# Patient Record
Sex: MALE | Race: WHITE | Employment: OTHER | URBAN - METROPOLITAN AREA
[De-identification: names, ages, dates, MRNs, and addresses within clinical notes are randomized per-mention and may not be internally consistent; named-entity substitution may affect disease eponyms.]

---

## 2017-12-19 PROBLEM — Z98.1 HISTORY OF FUSION OF CERVICAL SPINE: Status: ACTIVE | Noted: 2017-12-19

## 2018-01-25 PROBLEM — M50.30 DEGENERATIVE DISC DISEASE, CERVICAL: Status: ACTIVE | Noted: 2018-01-25

## 2021-12-25 ENCOUNTER — APPOINTMENT (OUTPATIENT)
Dept: CT IMAGING | Age: 61
End: 2021-12-25
Payer: COMMERCIAL

## 2021-12-25 ENCOUNTER — HOSPITAL ENCOUNTER (EMERGENCY)
Age: 61
Discharge: HOME | End: 2021-12-25
Payer: COMMERCIAL

## 2021-12-25 ENCOUNTER — APPOINTMENT (OUTPATIENT)
Dept: GENERAL RADIOLOGY | Age: 61
End: 2021-12-25
Payer: COMMERCIAL

## 2021-12-25 VITALS
HEART RATE: 74 BPM | OXYGEN SATURATION: 98 % | DIASTOLIC BLOOD PRESSURE: 82 MMHG | RESPIRATION RATE: 16 BRPM | TEMPERATURE: 98.7 F | BODY MASS INDEX: 26.61 KG/M2 | SYSTOLIC BLOOD PRESSURE: 135 MMHG | WEIGHT: 175 LBS

## 2021-12-25 DIAGNOSIS — G89.29 CHRONIC NECK PAIN: ICD-10-CM

## 2021-12-25 DIAGNOSIS — R52 GENERALIZED BODY ACHES: Primary | ICD-10-CM

## 2021-12-25 DIAGNOSIS — M50.30 DDD (DEGENERATIVE DISC DISEASE), CERVICAL: ICD-10-CM

## 2021-12-25 DIAGNOSIS — M50.30 BULGING OF CERVICAL INTERVERTEBRAL DISC: ICD-10-CM

## 2021-12-25 DIAGNOSIS — R51.9 NONINTRACTABLE HEADACHE, UNSPECIFIED CHRONICITY PATTERN, UNSPECIFIED HEADACHE TYPE: ICD-10-CM

## 2021-12-25 DIAGNOSIS — M54.2 CHRONIC NECK PAIN: ICD-10-CM

## 2021-12-25 LAB
ALBUMIN SERPL-MCNC: 4.4 G/DL (ref 3.5–5.2)
ALP BLD-CCNC: 85 U/L (ref 40–129)
ALT SERPL-CCNC: 27 U/L (ref 0–40)
ANION GAP SERPL CALCULATED.3IONS-SCNC: 13 MMOL/L (ref 7–16)
AST SERPL-CCNC: 30 U/L (ref 0–39)
BACTERIA: NORMAL /HPF
BASOPHILS ABSOLUTE: 0.04 E9/L (ref 0–0.2)
BASOPHILS RELATIVE PERCENT: 0.6 % (ref 0–2)
BILIRUB SERPL-MCNC: 0.5 MG/DL (ref 0–1.2)
BILIRUBIN URINE: NEGATIVE
BLOOD, URINE: NEGATIVE
BUN BLDV-MCNC: 15 MG/DL (ref 6–23)
CALCIUM SERPL-MCNC: 9.7 MG/DL (ref 8.6–10.2)
CHLORIDE BLD-SCNC: 103 MMOL/L (ref 98–107)
CLARITY: CLEAR
CO2: 23 MMOL/L (ref 22–29)
COLOR: YELLOW
CREAT SERPL-MCNC: 0.9 MG/DL (ref 0.7–1.2)
EOSINOPHILS ABSOLUTE: 0.06 E9/L (ref 0.05–0.5)
EOSINOPHILS RELATIVE PERCENT: 0.9 % (ref 0–6)
GFR AFRICAN AMERICAN: >60
GFR NON-AFRICAN AMERICAN: >60 ML/MIN/1.73
GLUCOSE BLD-MCNC: 100 MG/DL (ref 74–99)
GLUCOSE URINE: NEGATIVE MG/DL
HCT VFR BLD CALC: 44.7 % (ref 37–54)
HEMOGLOBIN: 15.4 G/DL (ref 12.5–16.5)
IMMATURE GRANULOCYTES #: 0.03 E9/L
IMMATURE GRANULOCYTES %: 0.5 % (ref 0–5)
KETONES, URINE: NEGATIVE MG/DL
LEUKOCYTE ESTERASE, URINE: NEGATIVE
LYMPHOCYTES ABSOLUTE: 0.9 E9/L (ref 1.5–4)
LYMPHOCYTES RELATIVE PERCENT: 13.8 % (ref 20–42)
MCH RBC QN AUTO: 32.8 PG (ref 26–35)
MCHC RBC AUTO-ENTMCNC: 34.5 % (ref 32–34.5)
MCV RBC AUTO: 95.1 FL (ref 80–99.9)
MONOCYTES ABSOLUTE: 0.43 E9/L (ref 0.1–0.95)
MONOCYTES RELATIVE PERCENT: 6.6 % (ref 2–12)
NEUTROPHILS ABSOLUTE: 5.05 E9/L (ref 1.8–7.3)
NEUTROPHILS RELATIVE PERCENT: 77.6 % (ref 43–80)
NITRITE, URINE: NEGATIVE
PDW BLD-RTO: 13.2 FL (ref 11.5–15)
PH UA: 5.5 (ref 5–9)
PLATELET # BLD: 257 E9/L (ref 130–450)
PMV BLD AUTO: 11 FL (ref 7–12)
POTASSIUM REFLEX MAGNESIUM: 4.8 MMOL/L (ref 3.5–5)
PROTEIN UA: NORMAL MG/DL
RBC # BLD: 4.7 E12/L (ref 3.8–5.8)
RBC UA: NORMAL /HPF (ref 0–2)
SODIUM BLD-SCNC: 139 MMOL/L (ref 132–146)
SPECIFIC GRAVITY UA: >=1.03 (ref 1–1.03)
TOTAL PROTEIN: 7.7 G/DL (ref 6.4–8.3)
TROPONIN, HIGH SENSITIVITY: <6 NG/L (ref 0–11)
UROBILINOGEN, URINE: 0.2 E.U./DL
WBC # BLD: 6.5 E9/L (ref 4.5–11.5)
WBC UA: NORMAL /HPF (ref 0–5)

## 2021-12-25 PROCEDURE — U0003 INFECTIOUS AGENT DETECTION BY NUCLEIC ACID (DNA OR RNA); SEVERE ACUTE RESPIRATORY SYNDROME CORONAVIRUS 2 (SARS-COV-2) (CORONAVIRUS DISEASE [COVID-19]), AMPLIFIED PROBE TECHNIQUE, MAKING USE OF HIGH THROUGHPUT TECHNOLOGIES AS DESCRIBED BY CMS-2020-01-R: HCPCS

## 2021-12-25 PROCEDURE — 80053 COMPREHEN METABOLIC PANEL: CPT

## 2021-12-25 PROCEDURE — 93005 ELECTROCARDIOGRAM TRACING: CPT | Performed by: NURSE PRACTITIONER

## 2021-12-25 PROCEDURE — U0005 INFEC AGEN DETEC AMPLI PROBE: HCPCS

## 2021-12-25 PROCEDURE — 81001 URINALYSIS AUTO W/SCOPE: CPT

## 2021-12-25 PROCEDURE — 84484 ASSAY OF TROPONIN QUANT: CPT

## 2021-12-25 PROCEDURE — 70450 CT HEAD/BRAIN W/O DYE: CPT

## 2021-12-25 PROCEDURE — 36415 COLL VENOUS BLD VENIPUNCTURE: CPT

## 2021-12-25 PROCEDURE — 99284 EMERGENCY DEPT VISIT MOD MDM: CPT

## 2021-12-25 PROCEDURE — 85025 COMPLETE CBC W/AUTO DIFF WBC: CPT

## 2021-12-25 PROCEDURE — 71045 X-RAY EXAM CHEST 1 VIEW: CPT

## 2021-12-25 PROCEDURE — 72125 CT NECK SPINE W/O DYE: CPT

## 2021-12-25 PROCEDURE — 6370000000 HC RX 637 (ALT 250 FOR IP): Performed by: NURSE PRACTITIONER

## 2021-12-25 RX ORDER — MECLIZINE HCL 12.5 MG/1
12.5 TABLET ORAL ONCE
Status: COMPLETED | OUTPATIENT
Start: 2021-12-25 | End: 2021-12-25

## 2021-12-25 RX ORDER — ACETAMINOPHEN 500 MG
1000 TABLET ORAL ONCE
Status: COMPLETED | OUTPATIENT
Start: 2021-12-25 | End: 2021-12-25

## 2021-12-25 RX ORDER — HYDROCODONE BITARTRATE AND ACETAMINOPHEN 5; 325 MG/1; MG/1
1 TABLET ORAL EVERY 6 HOURS PRN
Qty: 12 TABLET | Refills: 0 | Status: SHIPPED | OUTPATIENT
Start: 2021-12-25 | End: 2021-12-28

## 2021-12-25 RX ADMIN — MECLIZINE 12.5 MG: 12.5 TABLET ORAL at 11:56

## 2021-12-25 RX ADMIN — ACETAMINOPHEN 500 MG: 500 TABLET ORAL at 11:56

## 2021-12-25 ASSESSMENT — PAIN SCALES - GENERAL
PAINLEVEL_OUTOF10: 7
PAINLEVEL_OUTOF10: 6

## 2021-12-25 NOTE — ED NOTES
Iv established and labs drawn/sent, urine sent, covid went, ekg done, medicated per orders, pt given one tylenol 500mg d/t pt taking 500mg at 0930 today, pt to radioogy,      Lucho Michel RN  12/25/21 1200

## 2021-12-25 NOTE — ED PROVIDER NOTES
HPI:  12/25/21, Time: 1:36 PM MARINA Brito is a 62 y.o. male presenting to the ED for headache, neck pain, intermittent dizziness, generalized body aches, bilateral knee pain, patient states that last several days. He states that he has not received a Covid vaccine. Patient states that he is concerned for Covid. Patient states that he did have a 500 milligrams of Tylenol prior to coming to the emergency department. Patient denies any chest pain shortness of breath fever chills nominal pain diarrhea vomiting he denies any episodes or feeling lightheaded. Patient states that dizziness is mostly when he is coughing. He states that nothing makes his symptoms better nothing makes his symptoms worse he states that he does have a history of chronic neck pain and headache. He denies any weakness numbness or tingling to any of his upper or lower extremities. Patient also states that me he may have been exposed to Covid. States that he is also unvaccinated for Covid   NIH Stroke Scale/Score at time of initial evaluation:  1A: Level of Consciousness 0 - alert; keenly responsive   1B: Ask Month and Age 0 - answers both questions correctly   1C:  Tell Patient To Open and Close Eyes, then Hand  Squeeze 0 - performs both tasks correctly   2: Test Horizontal Extraocular Movements 0 - normal   3: Test Visual Fields 0 - no visual loss   4: Test Facial Palsy 0 - normal symmetric movement   5A: Test Left Arm Motor Drift 0 - no drift, limb holds 90 (or 45) degrees for full 10 seconds   5B: Test Right Arm Motor Drift 0 - no drift, limb holds 90 (or 45) degrees for full 10 seconds   6A: Test Left Leg Motor Drift 0 - no drift; leg holds 30 degree position for full 5 seconds   6B: Test Right Leg Motor Drift 0 - no drift; leg holds 30 degree position for full 5 seconds   7: Test Limb Ataxia   (FNF/Heel-Shin) 0 - absent   8: Test Sensation 0 - normal; no sensory loss   9: Test Language/Aphasia 0 - no aphasia, normal   10: Test Dysarthria 0 - normal   11: Test Extinction/Inattention 0 - no abnormality   Total Score: 0   12/25/21 at 1105. ROS:   Pertinent positives and negatives are stated within HPI, all other systems reviewed and are negative.  --------------------------------------------- PAST HISTORY ---------------------------------------------  Past Medical History:  has a past medical history of Headache, Hyperlipidemia, and Neck pain. Past Surgical History:  has a past surgical history that includes Cervical spine surgery and Cardiac catheterization (11/03/2017). Social History:  reports that he has been smoking. He has been smoking about 0.25 packs per day. He has never used smokeless tobacco. He reports current alcohol use. He reports that he does not use drugs. Family History: family history includes Cancer in his mother; Cancer (age of onset: 62) in his father; Diabetes in his father; Early Death in his father; Heart Disease in his maternal uncle; High Blood Pressure in his mother; High Cholesterol in his mother. The patients home medications have been reviewed. Allergies: Crestor [rosuvastatin calcium] and Lipitor [atorvastatin]    ---------------------------------------------------PHYSICAL EXAM--------------------------------------    Constitutional/General: Alert and oriented x3, well appearing, non toxic in NAD  Head: Normocephalic and atraumatic  Eyes: PERRL, EOMI  Mouth: Oropharynx clear, handling secretions, no trismus  Neck: Supple, full ROM, non tender to palpation in the midline, no stridor, no crepitus, no meningeal signs  Pulmonary: Lungs clear to auscultation bilaterally, no wheezes, rales, or rhonchi. Not in respiratory distress  Cardiovascular:  Regular rate. Regular rhythm. No murmurs, gallops, or rubs. 2+ distal pulses  Chest: no chest wall tenderness  Abdomen: Soft. Non tender. Non distended. +BS. No rebound, guarding, or rigidity.  No pulsatile masses appreciated. Musculoskeletal: Moves all extremities x 4. Warm and well perfused, no clubbing, cyanosis, or edema. Capillary refill <3 seconds  Skin: warm and dry. No rashes. Neurologic: GCS 15, CN 2-12 grossly intact, no focal deficits, symmetric strength 5/5 in the upper and lower extremities bilaterally  Psych: Normal Affect    -------------------------------------------------- RESULTS -------------------------------------------------  I have personally reviewed all laboratory and imaging results for this patient. Results are listed below.      LABS:  Results for orders placed or performed during the hospital encounter of 12/25/21   CBC Auto Differential   Result Value Ref Range    WBC 6.5 4.5 - 11.5 E9/L    RBC 4.70 3.80 - 5.80 E12/L    Hemoglobin 15.4 12.5 - 16.5 g/dL    Hematocrit 44.7 37.0 - 54.0 %    MCV 95.1 80.0 - 99.9 fL    MCH 32.8 26.0 - 35.0 pg    MCHC 34.5 32.0 - 34.5 %    RDW 13.2 11.5 - 15.0 fL    Platelets 025 605 - 537 E9/L    MPV 11.0 7.0 - 12.0 fL    Neutrophils % 77.6 43.0 - 80.0 %    Immature Granulocytes % 0.5 0.0 - 5.0 %    Lymphocytes % 13.8 (L) 20.0 - 42.0 %    Monocytes % 6.6 2.0 - 12.0 %    Eosinophils % 0.9 0.0 - 6.0 %    Basophils % 0.6 0.0 - 2.0 %    Neutrophils Absolute 5.05 1.80 - 7.30 E9/L    Immature Granulocytes # 0.03 E9/L    Lymphocytes Absolute 0.90 (L) 1.50 - 4.00 E9/L    Monocytes Absolute 0.43 0.10 - 0.95 E9/L    Eosinophils Absolute 0.06 0.05 - 0.50 E9/L    Basophils Absolute 0.04 0.00 - 0.20 E9/L   Troponin   Result Value Ref Range    Troponin, High Sensitivity <6 0 - 11 ng/L   Urinalysis, reflex to microscopic   Result Value Ref Range    Color, UA Yellow Straw/Yellow    Clarity, UA Clear Clear    Glucose, Ur Negative Negative mg/dL    Bilirubin Urine Negative Negative    Ketones, Urine Negative Negative mg/dL    Specific Gravity, UA >=1.030 1.005 - 1.030    Blood, Urine Negative Negative    pH, UA 5.5 5.0 - 9.0    Protein, UA TRACE Negative mg/dL    Urobilinogen, Urine 0. 2 <2.0 E.U./dL    Nitrite, Urine Negative Negative    Leukocyte Esterase, Urine Negative Negative   Microscopic Urinalysis   Result Value Ref Range    WBC, UA NONE 0 - 5 /HPF    RBC, UA NONE 0 - 2 /HPF    Bacteria, UA NONE SEEN None Seen /HPF   Comprehensive Metabolic Panel w/ Reflex to MG   Result Value Ref Range    Sodium 139 132 - 146 mmol/L    Potassium reflex Magnesium 4.8 3.5 - 5.0 mmol/L    Chloride 103 98 - 107 mmol/L    CO2 23 22 - 29 mmol/L    Anion Gap 13 7 - 16 mmol/L    Glucose 100 (H) 74 - 99 mg/dL    BUN 15 6 - 23 mg/dL    CREATININE 0.9 0.7 - 1.2 mg/dL    GFR Non-African American >60 >=60 mL/min/1.73    GFR African American >60     Calcium 9.7 8.6 - 10.2 mg/dL    Total Protein 7.7 6.4 - 8.3 g/dL    Albumin 4.4 3.5 - 5.2 g/dL    Total Bilirubin 0.5 0.0 - 1.2 mg/dL    Alkaline Phosphatase 85 40 - 129 U/L    ALT 27 0 - 40 U/L    AST 30 0 - 39 U/L   EKG 12 Lead   Result Value Ref Range    Ventricular Rate 74 BPM    Atrial Rate 74 BPM    P-R Interval 158 ms    QRS Duration 88 ms    Q-T Interval 376 ms    QTc Calculation (Bazett) 417 ms    P Axis 37 degrees    R Axis 18 degrees    T Axis 24 degrees       RADIOLOGY:  Interpreted by Radiologist.  CT Head WO Contrast   Final Result   No acute intracranial abnormality. There is age-appropriate atrophy and   small-vessel ischemic disease. CT cervical spine. There is no acute displaced fracture in the cervical spine. There is   previous anterior spinal fusion at C6-C7. The prevertebral soft tissues are   normal.  Degenerative changes with multilevel disc bulges are present from   C3-T1. Impression      No acute fractures. Diffuse degenerative changes with multilevel disc bulges from C3-T1. RECOMMENDATIONS:   Unavailable         CT CERVICAL SPINE WO CONTRAST   Final Result   No acute intracranial abnormality. There is age-appropriate atrophy and   small-vessel ischemic disease. CT cervical spine.       There is no acute displaced fracture in the cervical spine. There is   previous anterior spinal fusion at C6-C7. The prevertebral soft tissues are   normal.  Degenerative changes with multilevel disc bulges are present from   C3-T1. Impression      No acute fractures. Diffuse degenerative changes with multilevel disc bulges from C3-T1. RECOMMENDATIONS:   Unavailable         XR CHEST PORTABLE   Final Result   No acute process. EKG Interpretation  Interpreted by emergency department physician    Rhythm: normal sinus   Rate: normal  Axis: normal  Conduction: normal  ST Segments: no acute change  T Waves: no acute change    Clinical Impression: no acute changes  Comparison to prior EKG: None      ------------------------- NURSING NOTES AND VITALS REVIEWED ---------------------------   The nursing notes within the ED encounter and vital signs as below have been reviewed by myself. /82   Pulse 74   Temp 98.7 °F (37.1 °C) (Oral)   Resp 16   Wt 175 lb (79.4 kg)   SpO2 98%   BMI 26.61 kg/m²   Oxygen Saturation Interpretation: Normal    The patients available past medical records and past encounters were reviewed. ------------------------------ ED COURSE/MEDICAL DECISION MAKING----------------------  Medications   meclizine (ANTIVERT) tablet 12.5 mg (12.5 mg Oral Given 12/25/21 1156)   acetaminophen (TYLENOL) tablet 1,000 mg (500 mg Oral Given 12/25/21 1156)             Medical Decision Making:        Re-Evaluations:             Re-evaluation. Patients symptoms are improving      Consultations:             none    Critical Care: none        This patient's ED course included: a personal history and physicial eaxmination    This patient has remained hemodynamically stable during their ED course. At this time the patient is without objective evidence of an acute process requiring hospitalization or inpatient management.   They have remained hemodynamically stable throughout their entire ED visit and are stable for discharge with outpatient follow-up. The plan has been discussed in detail and they are aware of the specific conditions for emergent return, as well as the importance of follow-up. Patient states that he does have a history of severe degenerative changes has a history of neck surgery in the past as well. Patient at this time is nontoxic in appearance is in no distress. Patient denies any worsening symptoms he states that he is feeling better after medication. Patient also was placed on pain medication he states that he will follow-up with his primary care physician. Patient orthostatics negative patient's labs and CT findings were reviewed with the patient. Patient's questions were answered. Patient's wife is in the room her questions were answered as well. Patient stable for close outpatient follow-up. Patient also educated to quarantine until his Covid test results. Counseling: The emergency provider has spoken with the patient and discussed todays results, in addition to providing specific details for the plan of care and counseling regarding the diagnosis and prognosis. Questions are answered at this time and they are agreeable with the plan.       --------------------------------- IMPRESSION AND DISPOSITION ---------------------------------    IMPRESSION  1. Generalized body aches    2. Nonintractable headache, unspecified chronicity pattern, unspecified headache type    3. Chronic neck pain    4. DDD (degenerative disc disease), cervical    5. Bulging of cervical intervertebral disc        DISPOSITION  Disposition: Discharge to home  Patient condition is good        NOTE: This report was transcribed using voice recognition software.  Every effort was made to ensure accuracy; however, inadvertent computerized transcription errors may be present         Morro Briceño, CHAPINCITO - AKILAH  12/25/21 [id number]

## 2021-12-27 LAB — SARS-COV-2, PCR: NOT DETECTED

## 2021-12-28 LAB
EKG ATRIAL RATE: 74 BPM
EKG P AXIS: 37 DEGREES
EKG P-R INTERVAL: 158 MS
EKG Q-T INTERVAL: 376 MS
EKG QRS DURATION: 88 MS
EKG QTC CALCULATION (BAZETT): 417 MS
EKG R AXIS: 18 DEGREES
EKG T AXIS: 24 DEGREES
EKG VENTRICULAR RATE: 74 BPM

## 2022-09-20 ENCOUNTER — HOSPITAL ENCOUNTER (OUTPATIENT)
Dept: NEUROLOGY | Age: 62
Discharge: HOME | End: 2022-09-20
Payer: COMMERCIAL

## 2022-09-20 VITALS — BODY MASS INDEX: 26.67 KG/M2 | WEIGHT: 176 LBS | HEIGHT: 68 IN

## 2022-09-20 PROCEDURE — 95911 NRV CNDJ TEST 9-10 STUDIES: CPT

## 2022-09-20 PROCEDURE — 95886 MUSC TEST DONE W/N TEST COMP: CPT | Performed by: PHYSICAL MEDICINE & REHABILITATION

## 2022-09-20 PROCEDURE — 95886 MUSC TEST DONE W/N TEST COMP: CPT

## 2022-09-20 PROCEDURE — 95911 NRV CNDJ TEST 9-10 STUDIES: CPT | Performed by: PHYSICAL MEDICINE & REHABILITATION

## 2022-09-20 NOTE — PROCEDURES
[address] [facility]  [address], [address]  Phone: [phone]  Fax: [phone]      Referring Provider: Dayana Bosch MD  Primary Care Physician: Young Herrera MD  Patient Name: Estrellita Hodgkin  Patient YOB: 1960  Gender: male  BMI: Body mass index is 26.76 kg/m². Height 5' 8\" (1.727 m), weight 176 lb (79.8 kg). 9/20/2022    Reason for referral: Cervical intravertebral disc disorder, cervical radiculopathy    Description of clinical problem:   Patient reports history of chronic neck pain with prior C6-C7 fusion. He reports neck pain has been worsening recently. Neck pain radiates to both upper extremities. He endorses numbness and tingling diffusely in both upper extremities. Patient endorses bilateral upper extremity weakness. He denies bowel/bladder changes. Pain: Yes   ; Numbness/tingling: Yes; Weakness: Yes       Brief physical exam:   Sensory deficit: Yes, distal bilateral upper extremities; Weakness: No; Atrophy: No      Study Limitations: Difficulty relaxing cervical paraspinals, particularly on the right side    Motor NCS      Nerve / Sites Lat. Amplitude Distance Lat Diff Velocity Temp. Amp. 1-2    ms mV cm ms m/s °C %   R Median - APB      Wrist 5.26 8.2 8   32 100      Elbow 9.17 7.6 21 3.91 54 32 92.7   L Median - APB      Wrist 3.54 10.7 8   32.1 100      Elbow 7.76 9.7 21 4.22 50 32.1 90.3   R Ulnar - ADM      Wrist 3.39 14.6 8   32 100      B. Elbow 7.03 13.4 18 3.65 49 32 92.1      A. Elbow 8.96 13.1 10 1.93 52 32 89.7   L Ulnar - ADM      Wrist 3.18 11.5 8   32.1 100      B. Elbow 6.51 11.1 18 3.33 54 32 96.2      A. Elbow 8.33 11.2 10 1.82 55 32 96.9       Sensory NCS      Nerve / Sites Onset Lat Peak Lat PP Amp Distance Velocity Temp.    ms ms µV cm m/s °C   R Median - Digit II (Antidromic)      Mid Palm 1.35 1.93 46.6 7 52 32.1      Wrist 3.39 4.27 31.6 14 41 32.1   L Median - Digit II (Antidromic)      Mid Palm 1.67 2.24 26.9 7 42 32.1      Wrist 3.18 3.80 26.3 14 44 32.1   L Ulnar - Digit V (Antidromic)      Wrist 2.50 3.23 28.7 14 56 32.1   R Ulnar - Digit V (Antidromic)      Wrist 2.40 3.14 29.6 14 58 32.1      R Radial - Anatomical snuff box (Forearm)      Forearm 1.82 2.34 43.4 10 55 32.1   L Radial - Anatomical snuff box (Forearm)      Forearm 1.77 2.34 32.0 10 56 32.1       F  Wave      Nerve F Lat M Lat F-M Lat    ms ms ms   R Median - APB 29.9 5.2 24.8   R Ulnar - ADM 29.2 3.9 25.3   L Median - APB 28.1 3.8 24.4   L Ulnar - ADM 28.0 2.4 25.6       EMG         EMG Summary Table     Spontaneous MUAP Recruitment   Muscle IA Fib PSW Fasc H.F. Amp Dur. PPP Pattern   L. Deltoid N None None None None N N N N   L. Biceps brachii N None None None None N N N N   L. Triceps brachii N None None None None N N N N   L. Pronator teres N None None None None N N N N   L. First dorsal interosseous N None None None None N N N N   L. Abductor pollicis brevis N None None None None N N N N   L. Cervical paraspinals (mid) N None None None None       L. Cervical paraspinals (low) Sl Incr None 1+ None None       R. Deltoid N None None None None 1+ N N N   R. Biceps brachii N None None None None 1+ N 1+ N   R. Triceps brachii N None None None None 1+ N N N   R. Pronator teres N None None None None 1+ N N N   R. First dorsal interosseous N None None None None N N N N   R. Abductor pollicis brevis N None None None None N N N N   R. Cervical paraspinals (mid) N None None None None    Difficulty relaxing   R. Cervical paraspinals (low) N None None None None    Difficulty relaxing           Summary of Findings:   Nerve conduction studies:   Sensory nerve conduction study of the right median nerve revealed delayed distal latency and normal SNAP amplitude. Sensory nerve conduction studies of the left median, bilateral ulnar, and bilateral radial nerves showed normal distal latencies and normal SNAP amplitudes.    Motor nerve conduction study of the right median nerve revealed delayed distal latency normal CMAP amplitude and normal conduction velocity. Motor nerve conduction studies of the left median and bilateral ulnar nerves showed normal distal latencies, normal CMAP amplitudes, and normal conduction velocities. F-wave studies of the bilateral median and ulnar nerves revealed normal latencies. Needle EMG:   Needle EMG was performed using a monopolar needle. The following abnormalities were seen on needle EMG: Chronic neuropathic changes were noted in the right deltoid, biceps, triceps, and pronator teres. Abnormal spontaneous activity was noted in the left lower cervical paraspinals. All other muscles tested, as listed in the table above, demonstrated normal amplitude, duration, phases, and recruitment, without evidence of active denervation. Diagnostic Interpretation: This study was Abnormal.     1. There is electrodiagnostic evidence suggestive of chronic multilevel right cervical motor radiculopathy, involving at least levels C5, C6, C7. There is no evidence of active denervation. Right cervical radiculopathy was not proven by abnormal cervical paraspinal testing. It is noted that patient had difficulty relaxing cervical paraspinals for testing, particularly on the right side. 2.  There is electrodiagnostic evidence of left cervical motor radiculopathy, as indicated by abnormal left lower cervical paraspinal testing. Unable to further define, as all tested muscles in the left upper extremity were normal.  Clinical correlation is advised. 3.  There is electrodiagnostic evidence of a focal median mononeuropathy at or about the wrist on the right, moderate in degree, affecting sensory and motor fibers, and demyelinating in nature. There is no evidence of active denervation. This is consistent with a clinical diagnosis of right carpal tunnel syndrome.     4. There is no electrodiagnostic evidence of any other peripheral nerve mononeuropathy, plexopathy, or evidence suggestive of peripheral polyneuropathy in the bilateral upper extremities. Cannot evaluate for pure sensory radiculopathy or small fiber neuropathy by electrodiagnostic technique. Previous Study: There no prior study for comparison. .     Follow up EMG can be completed in the future if clinically indicated. Technologist: Cheri Ford    Physician:  Matthew Rodriguez MD  Physical Medicine and Rehabilitation       Nerve conduction studies and electromyography were performed according to our laboratory policies and procedures which can be provided upon request. All abnormal values are identified in the table.  Laboratory normal values can also be provided upon request.         Cc:   Michael Still MD

## 2023-03-23 ENCOUNTER — HOSPITAL ENCOUNTER (OUTPATIENT)
Dept: CT IMAGING | Age: 63
Discharge: HOME | End: 2023-03-25
Payer: COMMERCIAL

## 2023-03-23 DIAGNOSIS — R51.9 NONINTRACTABLE HEADACHE, UNSPECIFIED CHRONICITY PATTERN, UNSPECIFIED HEADACHE TYPE: ICD-10-CM

## 2023-03-23 DIAGNOSIS — M54.2 NECK PAIN: ICD-10-CM

## 2023-03-23 PROCEDURE — 70450 CT HEAD/BRAIN W/O DYE: CPT

## 2025-02-14 ENCOUNTER — HOSPITAL ENCOUNTER (EMERGENCY)
Age: 65
Discharge: HOME | End: 2025-02-14
Payer: OTHER MISCELLANEOUS

## 2025-02-14 ENCOUNTER — APPOINTMENT (OUTPATIENT)
Dept: CT IMAGING | Age: 65
End: 2025-02-14
Payer: OTHER MISCELLANEOUS

## 2025-02-14 VITALS
SYSTOLIC BLOOD PRESSURE: 141 MMHG | WEIGHT: 186.4 LBS | RESPIRATION RATE: 16 BRPM | OXYGEN SATURATION: 97 % | TEMPERATURE: 97.6 F | HEIGHT: 68 IN | HEART RATE: 73 BPM | DIASTOLIC BLOOD PRESSURE: 91 MMHG | BODY MASS INDEX: 28.25 KG/M2
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
## 2025-02-14 DIAGNOSIS — S16.1XXA STRAIN OF NECK MUSCLE, INITIAL ENCOUNTER: ICD-10-CM

## 2025-02-14 PROCEDURE — 70450 CT HEAD/BRAIN W/O DYE: CPT

## 2025-02-14 PROCEDURE — 72125 CT NECK SPINE W/O DYE: CPT

## 2025-02-14 PROCEDURE — 6370000000 HC RX 637 (ALT 250 FOR IP): Performed by: NURSE PRACTITIONER

## 2025-02-14 PROCEDURE — 99284 EMERGENCY DEPT VISIT MOD MDM: CPT

## 2025-02-14 RX ORDER — ACETAMINOPHEN 500 MG
1000 TABLET ORAL ONCE
Status: COMPLETED | OUTPATIENT
Start: 2025-02-14 | End: 2025-02-14

## 2025-02-14 RX ORDER — CYCLOBENZAPRINE HCL 10 MG
10 TABLET ORAL 3 TIMES DAILY PRN
Qty: 12 TABLET | Refills: 0 | Status: SHIPPED | OUTPATIENT
Start: 2025-02-14 | End: 2025-02-18

## 2025-02-14 RX ORDER — ONDANSETRON 4 MG/1
4 TABLET, ORALLY DISINTEGRATING ORAL ONCE
Status: DISCONTINUED | OUTPATIENT
Start: 2025-02-14 | End: 2025-02-14 | Stop reason: HOSPADM

## 2025-02-14 RX ORDER — NAPROXEN 500 MG/1
500 TABLET ORAL EVERY 12 HOURS PRN
Qty: 14 TABLET | Refills: 0 | Status: SHIPPED | OUTPATIENT
Start: 2025-02-14 | End: 2025-02-23

## 2025-02-14 RX ADMIN — ACETAMINOPHEN 1000 MG: 500 TABLET ORAL at 14:40

## 2025-02-14 ASSESSMENT — PAIN SCALES - GENERAL: PAINLEVEL_OUTOF10: 7

## 2025-02-14 ASSESSMENT — LIFESTYLE VARIABLES
HOW MANY STANDARD DRINKS CONTAINING ALCOHOL DO YOU HAVE ON A TYPICAL DAY: 1 OR 2
HOW OFTEN DO YOU HAVE A DRINK CONTAINING ALCOHOL: MONTHLY OR LESS

## 2025-02-14 ASSESSMENT — PAIN DESCRIPTION - DESCRIPTORS: DESCRIPTORS: ACHING;DISCOMFORT

## 2025-02-14 ASSESSMENT — PAIN DESCRIPTION - LOCATION: LOCATION: NECK

## 2025-02-14 ASSESSMENT — PAIN - FUNCTIONAL ASSESSMENT: PAIN_FUNCTIONAL_ASSESSMENT: 0-10
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
## 2025-02-23 ENCOUNTER — APPOINTMENT (OUTPATIENT)
Dept: CT IMAGING | Age: 65
End: 2025-02-23
Payer: OTHER MISCELLANEOUS

## 2025-02-23 ENCOUNTER — HOSPITAL ENCOUNTER (EMERGENCY)
Age: 65
Discharge: HOME | End: 2025-02-23
Attending: EMERGENCY MEDICINE
Payer: OTHER MISCELLANEOUS

## 2025-02-23 VITALS
DIASTOLIC BLOOD PRESSURE: 74 MMHG | OXYGEN SATURATION: 98 % | WEIGHT: 186.4 LBS | HEART RATE: 80 BPM | SYSTOLIC BLOOD PRESSURE: 132 MMHG | BODY MASS INDEX: 28.34 KG/M2 | TEMPERATURE: 97.6 F | RESPIRATION RATE: 16 BRPM

## 2025-02-23 DIAGNOSIS — S09.90XA CLOSED HEAD INJURY, INITIAL ENCOUNTER: ICD-10-CM

## 2025-02-23 DIAGNOSIS — S13.4XXA WHIPLASH INJURY TO NECK, INITIAL ENCOUNTER: Primary | ICD-10-CM

## 2025-02-23 DIAGNOSIS — R20.0 NUMBNESS AND TINGLING: ICD-10-CM
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
## 2025-02-23 DIAGNOSIS — R20.2 NUMBNESS AND TINGLING: ICD-10-CM

## 2025-02-23 PROCEDURE — 70450 CT HEAD/BRAIN W/O DYE: CPT

## 2025-02-23 PROCEDURE — 72125 CT NECK SPINE W/O DYE: CPT

## 2025-02-23 PROCEDURE — 99284 EMERGENCY DEPT VISIT MOD MDM: CPT

## 2025-02-23 PROCEDURE — 6370000000 HC RX 637 (ALT 250 FOR IP): Performed by: EMERGENCY MEDICINE

## 2025-02-23 RX ORDER — HYDROCODONE BITARTRATE AND ACETAMINOPHEN 5; 325 MG/1; MG/1
1 TABLET ORAL EVERY 6 HOURS PRN
Qty: 12 TABLET | Refills: 0 | Status: SHIPPED | OUTPATIENT
Start: 2025-02-23 | End: 2025-02-26

## 2025-02-23 RX ORDER — HYDROCODONE BITARTRATE AND ACETAMINOPHEN 5; 325 MG/1; MG/1
1 TABLET ORAL ONCE
Status: COMPLETED | OUTPATIENT
Start: 2025-02-23 | End: 2025-02-23

## 2025-02-23 RX ADMIN — HYDROCODONE BITARTRATE AND ACETAMINOPHEN 1 TABLET: 5; 325 TABLET ORAL at 13:14

## 2025-02-23 ASSESSMENT — PAIN DESCRIPTION - FREQUENCY: FREQUENCY: CONTINUOUS

## 2025-02-23 ASSESSMENT — PAIN SCALES - GENERAL
PAINLEVEL_OUTOF10: 8
PAINLEVEL_OUTOF10: 8

## 2025-02-23 ASSESSMENT — PAIN - FUNCTIONAL ASSESSMENT
PAIN_FUNCTIONAL_ASSESSMENT: 0-10
PAIN_FUNCTIONAL_ASSESSMENT: NONE - DENIES PAIN

## 2025-02-23 ASSESSMENT — PAIN DESCRIPTION - DESCRIPTORS: DESCRIPTORS: THROBBING;DISCOMFORT

## 2025-02-23 ASSESSMENT — PAIN DESCRIPTION - PAIN TYPE: TYPE: ACUTE PAIN

## 2025-02-23 ASSESSMENT — PAIN DESCRIPTION - ONSET: ONSET: SUDDEN

## 2025-02-23 ASSESSMENT — PAIN DESCRIPTION - LOCATION: LOCATION: HEAD;NECK
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
## 2025-02-23 NOTE — DISCHARGE INSTRUCTIONS
CALL YOUR SPINE SPECIALIST MONDAY TO MAKE AN APPOINTMENT    Call family doctor tomorrow and schedule a followup appointment to be seen in 2 days    TAKE ALL DISCHARGE PAPERS WITH YOU TO YOUR FOLLOWUP APPOINTMENTS    Have your doctor obtain  finalized report of CT scan today    CT CERVICAL SPINE WO CONTRAST   Final Result   No acute abnormality of the cervical spine.         CT HEAD WO CONTRAST   Final Result   No acute intracranial abnormality.
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
